# Patient Record
Sex: FEMALE | Race: OTHER | HISPANIC OR LATINO | URBAN - METROPOLITAN AREA
[De-identification: names, ages, dates, MRNs, and addresses within clinical notes are randomized per-mention and may not be internally consistent; named-entity substitution may affect disease eponyms.]

---

## 2020-03-19 ENCOUNTER — EMERGENCY (EMERGENCY)
Facility: HOSPITAL | Age: 55
LOS: 1 days | Discharge: HOME | End: 2020-03-19
Admitting: EMERGENCY MEDICINE
Payer: SELF-PAY

## 2020-03-19 VITALS
TEMPERATURE: 100 F | SYSTOLIC BLOOD PRESSURE: 149 MMHG | DIASTOLIC BLOOD PRESSURE: 98 MMHG | RESPIRATION RATE: 17 BRPM | HEART RATE: 92 BPM | OXYGEN SATURATION: 97 %

## 2020-03-19 PROCEDURE — 99283 EMERGENCY DEPT VISIT LOW MDM: CPT

## 2020-03-19 RX ORDER — PSEUDOEPHEDRINE HCL 30 MG
30 TABLET ORAL ONCE
Refills: 0 | Status: COMPLETED | OUTPATIENT
Start: 2020-03-19 | End: 2020-03-19

## 2020-03-19 RX ORDER — ACETAMINOPHEN 500 MG
975 TABLET ORAL ONCE
Refills: 0 | Status: COMPLETED | OUTPATIENT
Start: 2020-03-19 | End: 2020-03-19

## 2020-03-19 RX ADMIN — Medication 30 MILLIGRAM(S): at 23:41

## 2020-03-19 RX ADMIN — Medication 975 MILLIGRAM(S): at 23:41

## 2020-03-19 NOTE — ED PROVIDER NOTE - NSFOLLOWUPINSTRUCTIONS_ED_ALL_ED_FT
REST, NO STRENUOUS ACTIVITY  DRINK PLENTY OF FLUIDS  TAKE TYLENOL AS NEEEDED FOR FEVER/PAIN  TAKE OTHER MEDICATIONS IF YOU WERE PRESCRIBED  **PLEASE SELF QUARANTINE AND STAY AT HOME FOR THE NEXT 14 DAYS**  RETURN TO ER FOR WORSENING SYMPTOMS

## 2020-03-19 NOTE — ED PROVIDER NOTE - CLINICAL SUMMARY MEDICAL DECISION MAKING FREE TEXT BOX
55 y/o female c/o uri symptoms x 3 days   -probable viral illness (flu corona etc)  -rvp, cxr, tylenol  -supportive care  -dc with self quarantine

## 2020-03-19 NOTE — ED PROVIDER NOTE - PATIENT PORTAL LINK FT
You can access the FollowMyHealth Patient Portal offered by [facility] by registering at the following website: [email or web address]. By joining [employer]’s FollowMyHealth portal, you will also be able to view your health information using other applications (apps) compatible with our system.

## 2020-03-19 NOTE — ED ADULT TRIAGE NOTE - CHIEF COMPLAINT QUOTE
pt arrives Tajik speaking w/ c/o fever since yesterday. pt also c/o sore throat, ear pain, headache, back pain, chest pain, sob, and cough. pt denies any domestic or international travel. pt denies any sick contacts.

## 2020-03-19 NOTE — ED ADULT NURSE NOTE - OBJECTIVE STATEMENT
Patient alert and oriented x3 . c/o fever, chills, Nasal congestion, and ear ache for 4 days worse today. No c/o chest pain or short of breath .

## 2020-03-19 NOTE — ED ADULT NURSE NOTE - CHIEF COMPLAINT QUOTE
pt arrives Vietnamese speaking w/ c/o fever since yesterday. pt also c/o sore throat, ear pain, headache, back pain, chest pain, sob, and cough. pt denies any domestic or international travel. pt denies any sick contacts.

## 2020-03-19 NOTE — ED PROVIDER NOTE - OBJECTIVE STATEMENT
55 y/o female no pmh presents to ER c/o uri symptoms x 4 days. Pt. is primarily Swedish speaking - pacific interpreters utilized - patient states for the past 3-4 days has been experiencing fevers chills sore throat, nasal congestion as well as mild productive cough. Pt. states took motrin with minimal relief. Pt. works as a  and denies recent travel or sick contacts. Denies sob nvd weakness dizziness loc cp.   Bruce high risk covid exposure.

## 2020-03-20 LAB

## 2020-03-20 PROCEDURE — 71045 X-RAY EXAM CHEST 1 VIEW: CPT | Mod: 26

## 2020-03-20 RX ORDER — PSEUDOEPHEDRINE HCL 30 MG
1 TABLET ORAL
Qty: 20 | Refills: 0
Start: 2020-03-20

## 2022-03-06 NOTE — ED ADULT NURSE NOTE - PAIN RATING/NUMBER SCALE (0-10): ACTIVITY
Orders for admission, patient is aware of plan and ready to go upstairs. Any questions, please call ED RN Tanvi Juan at extension [phone].      Patient Covid vaccination status: Fully vaccinated     COVID Test Ordered in ED: Rapid SARS-CoV-2 by PCR    COVID Suspicion at Admission: N/A    Running Infusions:  None    Mental Status/LOC at time of transport: a/ox4    Other pertinent information: 2hr trop sent  Pt has PICC line as listed in LDAs 8

## 2024-02-08 NOTE — ED ADULT TRIAGE NOTE - ARRIVAL FROM
Please let the patient know that she has some moderate changes noted on her cervical spine x-ray  I would recommend this patient see spinal and we will be glad to refer her she is interested  Please let me know and if yes I will put the referral through
Home

## 2024-11-09 ENCOUNTER — EMERGENCY (EMERGENCY)
Facility: HOSPITAL | Age: 59
LOS: 1 days | Discharge: HOME | End: 2024-11-09
Attending: EMERGENCY MEDICINE | Admitting: EMERGENCY MEDICINE
Payer: COMMERCIAL

## 2024-11-09 VITALS
TEMPERATURE: 98 F | HEART RATE: 70 BPM | RESPIRATION RATE: 17 BRPM | SYSTOLIC BLOOD PRESSURE: 174 MMHG | OXYGEN SATURATION: 100 % | WEIGHT: 156.09 LBS | DIASTOLIC BLOOD PRESSURE: 98 MMHG

## 2024-11-09 LAB
ALBUMIN SERPL ELPH-MCNC: 4.1 G/DL — SIGNIFICANT CHANGE UP (ref 3.3–5)
ALP SERPL-CCNC: 97 U/L — SIGNIFICANT CHANGE UP (ref 40–120)
ALT FLD-CCNC: 139 U/L — HIGH (ref 4–33)
ANION GAP SERPL CALC-SCNC: 15 MMOL/L — HIGH (ref 7–14)
AST SERPL-CCNC: 102 U/L — HIGH (ref 4–32)
BASOPHILS # BLD AUTO: 0.06 K/UL — SIGNIFICANT CHANGE UP (ref 0–0.2)
BASOPHILS NFR BLD AUTO: 0.7 % — SIGNIFICANT CHANGE UP (ref 0–2)
BILIRUB SERPL-MCNC: 0.6 MG/DL — SIGNIFICANT CHANGE UP (ref 0.2–1.2)
BUN SERPL-MCNC: 14 MG/DL — SIGNIFICANT CHANGE UP (ref 7–23)
CALCIUM SERPL-MCNC: 9.3 MG/DL — SIGNIFICANT CHANGE UP (ref 8.4–10.5)
CHLORIDE SERPL-SCNC: 101 MMOL/L — SIGNIFICANT CHANGE UP (ref 98–107)
CO2 SERPL-SCNC: 24 MMOL/L — SIGNIFICANT CHANGE UP (ref 22–31)
CREAT SERPL-MCNC: 0.75 MG/DL — SIGNIFICANT CHANGE UP (ref 0.5–1.3)
EGFR: 92 ML/MIN/1.73M2 — SIGNIFICANT CHANGE UP
EOSINOPHIL # BLD AUTO: 0.12 K/UL — SIGNIFICANT CHANGE UP (ref 0–0.5)
EOSINOPHIL NFR BLD AUTO: 1.4 % — SIGNIFICANT CHANGE UP (ref 0–6)
GLUCOSE SERPL-MCNC: 99 MG/DL — SIGNIFICANT CHANGE UP (ref 70–99)
HCT VFR BLD CALC: 40.6 % — SIGNIFICANT CHANGE UP (ref 34.5–45)
HGB BLD-MCNC: 13.7 G/DL — SIGNIFICANT CHANGE UP (ref 11.5–15.5)
IANC: 4.55 K/UL — SIGNIFICANT CHANGE UP (ref 1.8–7.4)
IMM GRANULOCYTES NFR BLD AUTO: 0.2 % — SIGNIFICANT CHANGE UP (ref 0–0.9)
LYMPHOCYTES # BLD AUTO: 3.31 K/UL — HIGH (ref 1–3.3)
LYMPHOCYTES # BLD AUTO: 38.9 % — SIGNIFICANT CHANGE UP (ref 13–44)
MCHC RBC-ENTMCNC: 30.8 PG — SIGNIFICANT CHANGE UP (ref 27–34)
MCHC RBC-ENTMCNC: 33.7 G/DL — SIGNIFICANT CHANGE UP (ref 32–36)
MCV RBC AUTO: 91.2 FL — SIGNIFICANT CHANGE UP (ref 80–100)
MONOCYTES # BLD AUTO: 0.44 K/UL — SIGNIFICANT CHANGE UP (ref 0–0.9)
MONOCYTES NFR BLD AUTO: 5.2 % — SIGNIFICANT CHANGE UP (ref 2–14)
NEUTROPHILS # BLD AUTO: 4.55 K/UL — SIGNIFICANT CHANGE UP (ref 1.8–7.4)
NEUTROPHILS NFR BLD AUTO: 53.6 % — SIGNIFICANT CHANGE UP (ref 43–77)
NRBC # BLD: 0 /100 WBCS — SIGNIFICANT CHANGE UP (ref 0–0)
NRBC # FLD: 0 K/UL — SIGNIFICANT CHANGE UP (ref 0–0)
PLATELET # BLD AUTO: 264 K/UL — SIGNIFICANT CHANGE UP (ref 150–400)
POTASSIUM SERPL-MCNC: 3.6 MMOL/L — SIGNIFICANT CHANGE UP (ref 3.5–5.3)
POTASSIUM SERPL-SCNC: 3.6 MMOL/L — SIGNIFICANT CHANGE UP (ref 3.5–5.3)
PROT SERPL-MCNC: 8.5 G/DL — HIGH (ref 6–8.3)
RBC # BLD: 4.45 M/UL — SIGNIFICANT CHANGE UP (ref 3.8–5.2)
RBC # FLD: 12.7 % — SIGNIFICANT CHANGE UP (ref 10.3–14.5)
SODIUM SERPL-SCNC: 140 MMOL/L — SIGNIFICANT CHANGE UP (ref 135–145)
WBC # BLD: 8.5 K/UL — SIGNIFICANT CHANGE UP (ref 3.8–10.5)
WBC # FLD AUTO: 8.5 K/UL — SIGNIFICANT CHANGE UP (ref 3.8–10.5)

## 2024-11-09 PROCEDURE — 93010 ELECTROCARDIOGRAM REPORT: CPT

## 2024-11-09 PROCEDURE — 99285 EMERGENCY DEPT VISIT HI MDM: CPT

## 2024-11-09 PROCEDURE — 70490 CT SOFT TISSUE NECK W/O DYE: CPT | Mod: 26,MC

## 2024-11-09 RX ORDER — LIDOCAINE HCL 60 MG/3 ML
4 SYRINGE (ML) INJECTION ONCE
Refills: 0 | Status: COMPLETED | OUTPATIENT
Start: 2024-11-09 | End: 2024-11-09

## 2024-11-09 RX ORDER — BENZOCAINE, BUTAMBEN, AND TETRACAINE HYDROCHLORIDE .028; .004; .004 G/.2G; G/.2G; G/.2G
1 SOLUTION TOPICAL ONCE
Refills: 0 | Status: DISCONTINUED | OUTPATIENT
Start: 2024-11-09 | End: 2024-11-09

## 2024-11-09 RX ORDER — BENZOCAINE 200 MG/G
1 GEL ORAL ONCE
Refills: 0 | Status: COMPLETED | OUTPATIENT
Start: 2024-11-09 | End: 2024-11-09

## 2024-11-09 RX ADMIN — BENZOCAINE 1 SPRAY(S): 200 GEL ORAL at 18:55

## 2024-11-09 NOTE — ED ADULT NURSE NOTE - NSFALLUNIVINTERV_ED_ALL_ED
Bed/Stretcher in lowest position, wheels locked, appropriate side rails in place/Call bell, personal items and telephone in reach/Instruct patient to call for assistance before getting out of bed/chair/stretcher/Non-slip footwear applied when patient is off stretcher/[place] to call system/Physically safe environment - no spills, clutter or unnecessary equipment/Purposeful proactive rounding/Room/bathroom lighting operational, light cord in reach

## 2024-11-09 NOTE — ED ADULT NURSE NOTE - OBJECTIVE STATEMENT
A&Ox4. ambulatory. c/o fish bone stuck in throat. NAD. no drooling observed and PT is talking in full sentences. pt denies SOB, chest pain, dizziness, weakness, urinary symptoms, HA, n/v/d, fevers, chills, pain. respirations are even and un labored. skin intact. 20g placed to RAC. labs drawn and sent. safety precautions maintained.

## 2024-11-09 NOTE — ED PROVIDER NOTE - OBJECTIVE STATEMENT
59-year-old female with past medical history hypertension, migraines, appendectomy, presents today because this morning she was eating fish and subsequently got a fishbone stuck in her throat.  Now has baseline pain in her throat, the pain gets worse every time she tries to swallow, eat, drink.  She denies other pains including abdominal pain, nausea, vomiting, diarrhea, constipation, hiccups, burping, shortness of breath, dyspnea.

## 2024-11-09 NOTE — ED PROVIDER NOTE - ATTENDING CONTRIBUTION TO CARE
DR. CANDELARIO, ATTENDING MD-  I performed a face to face bedside interview with the patient regarding history of present illness, review of symptoms and past medical history. I completed an independent physical exam.  I have discussed the patient's plan of care with the resident.   Documentation as above in the note.    59 DR. CANDELARIO, ATTENDING MD-  I performed a face to face bedside interview with the patient regarding history of present illness, review of symptoms and past medical history. I completed an independent physical exam.  I have discussed the patient's plan of care with the resident.   Documentation as above in the note.    60 y/o female with fb sensation in throat after eating fish.  Concerned that fish bone is stuck in throat.  No difficulty breathing speaking or swallowing.  Obtain cbc cmp ct neck, pt declines pain meds at this time.

## 2024-11-09 NOTE — ED PROVIDER NOTE - PHYSICAL EXAMINATION
Toney Butler DO (PGY1)   Physical Exam:    Gen: NAD, AOx3  Head: NCAT  Neck: Mild pain upon palpation in the medial throat roughly in the area of the trachea anteriorly.  No pain upon palpation posteriorly nor at the anterior and posterior cervical lymph node chains laterally  HEENT: EOMI, PEERLA, pink and moist mucous membranes  Lung: CTAB, no respiratory distress, no wheezes/rhonchi/rales B/L  CV: RRR, no murmurs, rubs or gallops  Abd: soft, NT, ND, no guarding, no rigidity, no rebound tenderness, no CVA tenderness   MSK: no visible deformities, ROM normal in UE/LE, no back pain  Neuro: No focal sensory or motor deficits. Sensation intact to light touch all extremities.  Skin: Warm, well perfused, no rash, no leg swelling  Psych: normal affect, calm

## 2024-11-09 NOTE — ED PROVIDER NOTE - CLINICAL SUMMARY MEDICAL DECISION MAKING FREE TEXT BOX
59-year-old female with past medical history hypertension, migraines, appendectomy, presents today because this morning she was eating fish and subsequently got a fishbone stuck in her throat.      Physical examination remarkable for Mild pain upon palpation in the medial throat roughly in the area of the trachea anteriorly.  No pain upon palpation posteriorly nor at the anterior and posterior cervical lymph node chains laterally    Differential diagnosis includes foreign body    Plan includes symptomatic management with benzocaine spray for the pain, will attempt removal and reassess

## 2024-11-09 NOTE — ED PROVIDER NOTE - PATIENT PORTAL LINK FT
You can access the FollowMyHealth Patient Portal offered by [facility] [place] by registering at the following website: [email or web address]. By joining [employer]’s FollowMyHealth portal, you will also be able to view your health information using other applications (apps) compatible with our system.

## 2024-11-09 NOTE — ED PROVIDER NOTE - PROGRESS NOTE DETAILS
Saint Jose, DO (PGY2): Patient signed out to me pending ENT consult.  Briefly, she is a 59-year-old female, with a history of hypertension, migraines, who presented today for fishbone stuck in her throat.  CT showing foreign body in right palatine tonsil.  ENT paged awaiting callback. Saint Joes, DO (PGY2): second page to ENT Saint Jose, DO (PGY2): ENT to come see patient in the ED Saint Jose, DO (PGY2): ENT recommended nebulized lidocaine 4% which has been ordered ESTUARDO:  Patient signed out to me by Dr. Enrique at 2300 pending ENT evaluation for foreign body here and ENT removed foreign body at bedside.  Patient tolerated procedure well.  Will discharge.

## 2024-11-09 NOTE — ED ADULT TRIAGE NOTE - CHIEF COMPLAINT QUOTE
pt c/o R throat discomfort after eating fish this morning with bones. pt able to complete full sentences, no drooling or respiratory distress noted  history of HTN

## 2024-11-10 VITALS
DIASTOLIC BLOOD PRESSURE: 96 MMHG | RESPIRATION RATE: 16 BRPM | SYSTOLIC BLOOD PRESSURE: 156 MMHG | HEART RATE: 70 BPM | TEMPERATURE: 98 F | OXYGEN SATURATION: 100 %

## 2024-11-10 RX ORDER — LIDOCAINE HYDROCHLORIDE 40 MG/ML
15 SOLUTION TOPICAL ONCE
Refills: 0 | Status: COMPLETED | OUTPATIENT
Start: 2024-11-10 | End: 2024-11-10

## 2024-11-10 RX ADMIN — LIDOCAINE HYDROCHLORIDE 15 MILLILITER(S): 40 SOLUTION TOPICAL at 02:30

## 2024-11-10 RX ADMIN — Medication 4 MILLILITER(S): at 00:40

## 2024-11-10 RX ADMIN — LIDOCAINE HYDROCHLORIDE 15 MILLILITER(S): 40 SOLUTION TOPICAL at 02:08

## 2024-11-10 NOTE — CONSULT NOTE ADULT - SUBJECTIVE AND OBJECTIVE BOX
HPI: 59y Female here with foreign body sensation after eating fish. Patient without difficulty breathing, swallowing or voice changes.     CT neck with fish bone in the right tonsil    Allergies    No Known Allergies    Intolerances      PAST MEDICAL & SURGICAL HISTORY:  Migraines    Hypertension      REVIEW OF SYSTEMS    General:	  As per HPI      MEDICATIONS:    Vital Signs Last 24 Hrs  T(C): 36.6 (09 Nov 2024 20:13), Max: 36.8 (09 Nov 2024 15:36)  T(F): 97.8 (09 Nov 2024 20:13), Max: 98.2 (09 Nov 2024 15:36)  HR: 66 (09 Nov 2024 20:13) (66 - 70)  BP: 160/96 (09 Nov 2024 20:13) (160/96 - 174/98)  BP(mean): --  RR: 19 (09 Nov 2024 20:13) (17 - 19)  SpO2: 100% (09 Nov 2024 20:13) (100% - 100%)    Parameters below as of 09 Nov 2024 20:13  Patient On (Oxygen Delivery Method): room air      LABS:  CBC-    11-09    140  |  101  |  14  ----------------------------<  99  3.6   |  24  |  0.75    Ca    9.3      09 Nov 2024 18:23    TPro  8.5[H]  /  Alb  4.1  /  TBili  0.6  /  DBili  x   /  AST  102[H]  /  ALT  139[H]  /  AlkPhos  97  11-09        PHYSICAL EXAM:    ENT EXAM-   Constitutional: Well-developed, well-nourished.   Voice: No hoarseness.     Head:  normocephalic, atraumatic.   Ears:  External ears normal  Nose:  Septum intact, midline, deviated.  Inferior turbinates normal bilateral  OC/OP: Tongue midline, tonsils present/absent. Floor of mouth, buccal mucosa, lips, hard palate, soft palate, uvula, posterior pharyngeal wall normal.  Mucosa moist.  Neck:  Trachea midline.  Thyroid, parotid and submandibular glands normal.  Lymph:  No cervical adenopathy.    MULTISYSTEM EXAM-  Neuro/Psych:  Awake, alert, cooperative  Cranial nerves: 2-12 grossly intact bilaterally.  Eyes:  EOMI, no nystagmus.  Pulm:  No dyspnea, non-labored breathing, no stridor or stertor        RADIOLOGY & ADDITIONAL STUDIES:  CT neck: Curvilinear foreign body within the right palatine tonsil of approximately 1.2 cm in length        Assessment/Plan:  59y Female here with fishbone in the right tonsil s/p removal             HPI: 59y Female here with foreign body sensation after eating fish. Patient without difficulty breathing, swallowing or voice changes.     CT neck with fish bone in the right  tonsil    Allergies    No Known Allergies    Intolerances      PAST MEDICAL & SURGICAL HISTORY:  Migraines    Hypertension      REVIEW OF SYSTEMS    General:	  As per HPI      MEDICATIONS:    Vital Signs Last 24 Hrs  T(C): 36.6 (09 Nov 2024 20:13), Max: 36.8 (09 Nov 2024 15:36)  T(F): 97.8 (09 Nov 2024 20:13), Max: 98.2 (09 Nov 2024 15:36)  HR: 66 (09 Nov 2024 20:13) (66 - 70)  BP: 160/96 (09 Nov 2024 20:13) (160/96 - 174/98)  BP(mean): --  RR: 19 (09 Nov 2024 20:13) (17 - 19)  SpO2: 100% (09 Nov 2024 20:13) (100% - 100%)    Parameters below as of 09 Nov 2024 20:13  Patient On (Oxygen Delivery Method): room air      LABS:  CBC-    11-09    140  |  101  |  14  ----------------------------<  99  3.6   |  24  |  0.75    Ca    9.3      09 Nov 2024 18:23    TPro  8.5[H]  /  Alb  4.1  /  TBili  0.6  /  DBili  x   /  AST  102[H]  /  ALT  139[H]  /  AlkPhos  97  11-09        PHYSICAL EXAM:    ENT EXAM-   Constitutional: Well-developed, well-nourished.   Voice: No hoarseness.     Head:  normocephalic, atraumatic.   Ears:  External ears normal  Nose:  Septum intact, midline, deviated.  Inferior turbinates normal bilateral  LARYNGOSCOPY EXAM:     Verbal consent was obtained from patient prior to procedure.    Indication: Foreign body in oropharynx    Anesthesia: Afrin spray was applied to the nasal cavities.    Flexible laryngoscopy was performed and revealed the following:    Nasopharynx had no mass or exudate.    Base of tongue was symmetric and not enlarged. Fish bone seen sticking out of right tonsil    Vallecula was clear    Epiglottis, both aryepiglottic folds and both false vocal folds were normal    Arytenoids both without edema and erythema     True vocal folds were fully mobile and without lesions.     Post cricoid area was clear    Interarytenoid edema was absent    The patient tolerated the procedure well.  OC/OP: Tongue midline, tonsils present/absent. Floor of mouth, buccal mucosa, lips, hard palate, soft palate, uvula, posterior pharyngeal wall normal.  Mucosa moist.  Neck:  Trachea midline.  Thyroid, parotid and submandibular glands normal.  Lymph:  No cervical adenopathy.    MULTISYSTEM EXAM-  Neuro/Psych:  Awake, alert, cooperative  Cranial nerves: 2-12 grossly intact bilaterally.  Eyes:  EOMI, no nystagmus.  Pulm:  No dyspnea, non-labored breathing, no stridor or stertor          RADIOLOGY & ADDITIONAL STUDIES:  CT neck: Curvilinear foreign body within the right palatine tonsil of approximately 1.2 cm in length        Assessment/Plan:  59y Female here with fishbone in the right tonsil s/p removal with glidesope by ED. Patient is doing well.      -Outpatient follow up as needed.